# Patient Record
Sex: MALE | Race: BLACK OR AFRICAN AMERICAN | NOT HISPANIC OR LATINO | URBAN - METROPOLITAN AREA
[De-identification: names, ages, dates, MRNs, and addresses within clinical notes are randomized per-mention and may not be internally consistent; named-entity substitution may affect disease eponyms.]

---

## 2021-05-13 ENCOUNTER — EMERGENCY (EMERGENCY)
Facility: HOSPITAL | Age: 31
LOS: 0 days | Discharge: HOME | End: 2021-05-13
Attending: EMERGENCY MEDICINE
Payer: COMMERCIAL

## 2021-05-13 VITALS
RESPIRATION RATE: 17 BRPM | WEIGHT: 199.96 LBS | SYSTOLIC BLOOD PRESSURE: 134 MMHG | TEMPERATURE: 98 F | OXYGEN SATURATION: 100 % | DIASTOLIC BLOOD PRESSURE: 99 MMHG | HEIGHT: 71 IN | HEART RATE: 77 BPM

## 2021-05-13 DIAGNOSIS — K12.2 CELLULITIS AND ABSCESS OF MOUTH: ICD-10-CM

## 2021-05-13 PROCEDURE — 99283 EMERGENCY DEPT VISIT LOW MDM: CPT

## 2021-05-13 RX ORDER — PENICILLIN V POTASSIUM 250 MG
1 TABLET ORAL
Qty: 28 | Refills: 0
Start: 2021-05-13 | End: 2021-05-19

## 2021-05-13 RX ORDER — IBUPROFEN 200 MG
600 TABLET ORAL ONCE
Refills: 0 | Status: COMPLETED | OUTPATIENT
Start: 2021-05-13 | End: 2021-05-13

## 2021-05-13 RX ORDER — PENICILLIN V POTASSIUM 250 MG
500 TABLET ORAL ONCE
Refills: 0 | Status: COMPLETED | OUTPATIENT
Start: 2021-05-13 | End: 2021-05-13

## 2021-05-13 RX ORDER — IBUPROFEN 200 MG
1 TABLET ORAL
Qty: 20 | Refills: 0
Start: 2021-05-13 | End: 2021-05-17

## 2021-05-13 RX ORDER — OXYCODONE AND ACETAMINOPHEN 5; 325 MG/1; MG/1
1 TABLET ORAL ONCE
Refills: 0 | Status: DISCONTINUED | OUTPATIENT
Start: 2021-05-13 | End: 2021-05-13

## 2021-05-13 RX ADMIN — Medication 500 MILLIGRAM(S): at 05:15

## 2021-05-13 RX ADMIN — Medication 600 MILLIGRAM(S): at 05:16

## 2021-05-13 RX ADMIN — OXYCODONE AND ACETAMINOPHEN 1 TABLET(S): 5; 325 TABLET ORAL at 05:16

## 2021-05-13 NOTE — ED PROVIDER NOTE - CLINICAL SUMMARY MEDICAL DECISION MAKING FREE TEXT BOX
Patient with pain and small abscess of hard palate.  VSS.  Exam and symptoms do not raise concern for deep tracking abscess at this time, patient well appearing and nontoxic, shared decision made with patient to forego labs, CT.  Will dc with analgesia and antibiotics.  Small needle drainage performed in ER with bloody output, no reynold pus.  Patient advised to return if condition worsens rapidly.  Discussed results and outcome of today's visit with the patient.  Patient advised to please follow up with another healthcare provider within the next 24 hours and return to the Emergency Department for worsening symptoms or any other concerns.  Patient advised that their doctor may call  to follow up on the specific results of the tests performed today in the emergency department.   Patient appears well on discharge.

## 2021-05-13 NOTE — ED ADULT TRIAGE NOTE - CHIEF COMPLAINT QUOTE
abscess in the hard palate x 4 days, had fever , took 3 baby ASA one hour ago , has a dentist appointment today.

## 2021-05-13 NOTE — ED PROVIDER NOTE - PHYSICAL EXAMINATION
Gen: Alert, NAD, well appearing  Head: NC, AT, no facial swelling, no facial ttp, EOMI, normal lids/conjunctiva  ENT: normal hearing, patent oropharynx without erythema/exudate, uvula midline, normal BL nares, no visible caries, small fluctuant abscess on anterior hard palate ~0.5 cm  Neck: +supple, no tenderness, no lymphadenopathy, +Trachea midline  Pulm: Bilateral BS, normal resp effort, no wheeze/stridor/retractions  CV: RRR, no M/R/G, +dist pulses  Abd: soft, NT/ND, Negative [place] signs, +BS, no palpable masses  Mskel: no edema/erythema/cyanosis  Skin: no rash, warm/dry  Neuro: AAOx3, no apparent sensory/motor deficits, coordination intact

## 2021-05-13 NOTE — ED ADULT NURSE NOTE - OBJECTIVE STATEMENT
Pt presents to the ED co abscess on hard palate. Pt presents to the ED co abscess on hard palate. Pt states this has been going on since sunday Pt presents to the ED A&Ox4. Pt co abscess on hard palate. Pt states this has been going on since sunday. States he came to the ED due to increasing pain and unable to sleep due to pain. Endorses having an appt with dentist this week. Pt noted to have reddish indentation on top of pallet.

## 2021-05-13 NOTE — ED PROVIDER NOTE - OBJECTIVE STATEMENT
Pertinent PMH/PSH/FHx/SHx and Review of Systems contained within:  Patient presents to the ED for abscess in roof of mouth. Patient reports that earlier today he noted a bump in the top of his mouth in the hard palate, had low grade fever of 101F at home.  He has an appointment today at 4 pm with his dentist.  He comes in because he is having too much pain and cannot sleep.  He denies pain to mid face, nasal pain, has no facial swelling, no dental pain, or blurry vision.     Relevant PMHx/SHx/SOCHx/FAMH:  Denies relevant pmh or psh  Patient denies EtOH/tobacco/illicit substance use, occ cigars    ROS: No fever/chills, No headache/photophobia/eye pain/changes in vision, No ear pain/sore throat/dysphagia, No chest pain/palpitations, no SOB/cough/wheeze/stridor, No abdominal pain, No N/V/D/melena, no dysuria/frequency/discharge, No neck/back pain, no rash, no changes in neurological status/function.

## 2021-05-13 NOTE — ED PROVIDER NOTE - PATIENT PORTAL LINK FT
You can access the FollowMyHealth Patient Portal offered by [facility] by registering at the following website: [email or web address]. By joining [employer]’s FollowMyHealth portal, you will also be able to view your health information using other applications (apps) compatible with our system.